# Patient Record
(demographics unavailable — no encounter records)

---

## 2024-11-13 NOTE — PHYSICAL EXAM
[Alert] : alert [Obese] : obese [No Acute Distress] : no acute distress [Normal Sclera/Conjunctiva] : normal sclera/conjunctiva [EOMI] : extra ocular movement intact [No Proptosis] : no proptosis [No Lid Lag] : no lid lag [No LAD] : no lymphadenopathy [Supple] : the neck was supple [No Thyroid Nodules] : no palpable thyroid nodules [No Respiratory Distress] : no respiratory distress [No Accessory Muscle Use] : no accessory muscle use [Normal Rate and Effort] : normal respiratory rate and effort [Normal Gait] : normal gait [No Involuntary Movements] : no involuntary movements were seen [Oriented x3] : oriented to person, place, and time [Normal Insight/Judgement] : insight and judgment were intact [Acanthosis Nigricans] : no acanthosis nigricans [de-identified] : +b/l hand tremor

## 2024-11-13 NOTE — ASSESSMENT
[FreeTextEntry1] : Graves' hyperthyroidism, likely in remission Diagnosed in 2021 Currently off methimazole Discussed importance of compliance with both endocrinology follow ups as well as compliance with medication. Reviewed labs from 11/9/24, noted TSI Ab still faintly positive, TSH receptor is negative, TSH normal at 1.92  Obesity Lost 10lbs since last visit has been working on diet and exercise has underlying prediabetes, A1c is now improved at 5.5% Discussed importance of lifestyle modifications including diabetic diet including following a carbohydrate balanced diet, healthy plating, portion control, and minimize snacking. and the importance of incorporating protein in meals. We also discussed appropriate alternative food choices including sugar alternatives. We discussed the importance of incorporating exercise, to increase physical activity at least 30min/day   Answered all questions today; patient verbalized understanding of the above RTO in 4-6 months with covering endo provider while I am out on maternity leave

## 2024-11-13 NOTE — REASON FOR VISIT
[Follow - Up] : a follow-up visit [DM Type 2] : DM Type 2 [Home] : at home, [unfilled] , at the time of the visit. [Medical Office: ([facility])___] : at the medical office located in  [Mother] : mother [Patient] : the patient

## 2024-11-13 NOTE — HISTORY OF PRESENT ILLNESS
[FreeTextEntry1] : This is a 19-year-old male presents for Graves' hyperthyroidism and prediabetes  Patient is here for teleheallth with his mother today. He missed endocrinology office visit in march 2024 and last visit was in Jan 2024. He was advised to be more compliant with follow ups and medication. At last office visit in July he was noted to be heading towards remission, and came off Methimazole, noted TSI Ab is faintly positive but downtrending. TSH receptor Ab negative  Of note patient has strong family history of thyroid disease as his mother had Graves' and 2 sisters have hypothyroidism.  He was diagnosed with Graves' in 2021 and has been on methimazole.   Patient is marketing student in college, lost weight about 30lbs and lost another 10lbs since last office visit inJuly 2024  Family history: 26-year-old sister underwent gastric bypass surgery in 2020.  2 sisters with hypothyroidism in mother with Graves'.